# Patient Record
(demographics unavailable — no encounter records)

---

## 2025-06-04 NOTE — REVIEW OF SYSTEMS
[As Noted in HPI] : as noted in HPI [Vertigo] : vertigo [Negative] : Heme/Lymph [de-identified] : head pain/throbbing

## 2025-06-04 NOTE — REASON FOR VISIT
[FreeTextEntry1] : Her neurologist does not want her to increase Neurontin. Taught hip abd. [New Patient Visit] : a new patient visit [Family Member] : family member

## 2025-06-04 NOTE — ASSESSMENT
[FreeTextEntry1] : Impression:  45yr old female with past medical history significant for subarachnoid hemorrhage in 2017 while living in [place]. 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  Prior imaging form [place] not available for review at this time. She states she has been told there is narrowing in the stents for year but she has not had a stroke. She is only on aspirin 81mg daily for antiplatelet medication.   Chief complaint of vertigo when she wakes up in the morning intermittently as well as pain and throbbing on the side of head where the aneurysm is.  Plan:  Recommend mra brain non con nova now to evaluate for cerebral aneurysm and evaluate the flow through the right vertebral artery assess for vertebral basilar insufficiency  Recommend mri brain non con now to evaluate for stroke given her symptoms of vertigo and potential vertebral basilar insufficiency  Educated on signs and symptoms of a stroke and should they arise she will go to the ER Continue aspirin Schedule visit with neurosurgeon Dr. Felipe Mackay after the mr imaging is completed

## 2025-06-04 NOTE — HISTORY OF PRESENT ILLNESS
[de-identified] : Odalis Guerra is a 45yr old female here for a new patient visit. She has past medical history significant for subarachnoid hemorrhage in 2017 while living in [place]. 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 She lives in [place] now and is establishing new neurosurgical care with us. Old imaging form [place] not available for review at this time. She states she has been told there is narrowing in the stents for year but she has not had a stroke.  Chief complaint of vertigo when she wakes up in the morning intermittently as well as pain and throbbing on the side of head where the aneurysm is.